# Patient Record
Sex: MALE | Race: WHITE | URBAN - METROPOLITAN AREA
[De-identification: names, ages, dates, MRNs, and addresses within clinical notes are randomized per-mention and may not be internally consistent; named-entity substitution may affect disease eponyms.]

---

## 2017-08-28 ENCOUNTER — OFFICE VISIT (OUTPATIENT)
Dept: URGENT CARE | Facility: URGENT CARE | Age: 27
End: 2017-08-28
Payer: COMMERCIAL

## 2017-08-28 VITALS
OXYGEN SATURATION: 98 % | SYSTOLIC BLOOD PRESSURE: 116 MMHG | DIASTOLIC BLOOD PRESSURE: 72 MMHG | HEART RATE: 61 BPM | TEMPERATURE: 98 F

## 2017-08-28 DIAGNOSIS — H69.93 DYSFUNCTION OF EUSTACHIAN TUBE, BILATERAL: ICD-10-CM

## 2017-08-28 DIAGNOSIS — H55.89 EYE MOVEMENT IRREGULARITY: ICD-10-CM

## 2017-08-28 DIAGNOSIS — J30.1 ACUTE SEASONAL ALLERGIC RHINITIS DUE TO POLLEN: ICD-10-CM

## 2017-08-28 DIAGNOSIS — J45.41 MODERATE PERSISTENT ASTHMA WITH EXACERBATION: ICD-10-CM

## 2017-08-28 DIAGNOSIS — H10.33 ACUTE BACTERIAL CONJUNCTIVITIS OF BOTH EYES: Primary | ICD-10-CM

## 2017-08-28 PROCEDURE — 99203 OFFICE O/P NEW LOW 30 MIN: CPT | Performed by: FAMILY MEDICINE

## 2017-08-28 RX ORDER — FLUTICASONE PROPIONATE 50 MCG
1-2 SPRAY, SUSPENSION (ML) NASAL DAILY
Qty: 16 G | Refills: 0 | Status: SHIPPED | OUTPATIENT
Start: 2017-08-28

## 2017-08-28 RX ORDER — MONTELUKAST SODIUM 10 MG/1
10 TABLET ORAL AT BEDTIME
Qty: 30 TABLET | Refills: 0 | Status: SHIPPED | OUTPATIENT
Start: 2017-08-28

## 2017-08-28 RX ORDER — POLYMYXIN B SULFATE AND TRIMETHOPRIM 1; 10000 MG/ML; [USP'U]/ML
1 SOLUTION OPHTHALMIC EVERY 4 HOURS
Qty: 10 ML | Refills: 0 | Status: SHIPPED | OUTPATIENT
Start: 2017-08-28 | End: 2017-09-03

## 2017-08-28 NOTE — PATIENT INSTRUCTIONS
Controlling Asthma Triggers: Allergens  For many people with lung problems such as asthma or COPD, inhaling allergens leads to inflamed airways. Allergens also cause other types of reactions in some people. For example, a runny nose, itchy, watery eyes, or a skin rash. Do your best to avoid allergens that trigger symptoms. The tips below can help to lessen any reaction you may have to certain allergens.     Wash bedding in hot water (130 F/54.4 C) each week.    Dust mites  Dust mites are tiny bugs too small to see or feel. But they can be a major trigger for allergy and asthma symptoms. Dust mites live in mattresses, bedding, carpets, and upholstered furniture. They can be carried on indoor dust. They thrive in warm, moist environments.  ? Wash bedding in hot water (130 F/54.4 C) each week. This kills the dust mites.  ? Cover mattress and pillows with special dust-mite-proof (hypoallergenic) cases.  ? Don t use upholstered furniture such as sofas or chairs in the bedroom.  ? Use allergy-proof filters for air conditioners and furnaces. Follow product maker's instructions for maintaining and replacing filters.  ? If you can, replace [id number] carpets with wood, tile, or linoleum floors. This is especially important in the bedroom.  Animals  Animals with fur or feathers often make allergens. These are shed as tiny particles called dander. Dander can float through the air or stick to carpet, clothing, and furniture.  ? Choose a pet that doesn t have fur or feathers. Examples are fish and reptiles.  ? Keep pets with fur or feathers out of your home. If you can t do this, be sure to keep them out of your bedroom. But keeping a pet out of your bedroom doesn't mean your bedroom is free of pet allergens. If you sit on the couch in the living room and then go into your bedroom, you have brought the pet allergen there.  ? Wash your hands and clothes after handling pets.  Mold  Mold grows in damp places, such as bathrooms,  basements, and closets. It can grow anywhere flooding or a fire has caused water damage. Mold can live behind the walls if there has been water damage.  ? Clean damp areas weekly to prevent mold growth. This includes shower stalls and sinks. You may need someone to clean these areas for you. Or, try wearing a mask.  ? Run an exhaust fan while bathing. Or leave a window or door open in the bathroom.  ? Repair water leaks in or around your home.  ? Have someone else cut grass or rake leaves, if possible.  ? Don t use vaporizers, or humidifiers. These put water into the air and encourage mold growth.  Pollen  Pollen from trees, grasses, and weeds is a common allergen. Flower pollens are generally not a problem.  ? Try to learn what types of pollen affect you most. Pollen levels vary depending on the plant, the season, and the time of day.  ? Use air conditioning instead of opening the windows in your home or car. In the car, choose the setting to recirculate the air, so less pollen gets in.  ? Have someone else do yardwork, if possible.  ? Change clothes in a mudroom when you get home if you are highly allergic to pollens. This will keep most of the pollen from entering the house.  Cockroaches and mice  Cockroaches and mice are common household pests. They also produce allergens.  ? Keep your kitchen clean and dry. A leaky faucet or drain can attract roaches.  ? Remove garbage from your home daily.  ? Store food in tightly sealed containers. Wash dishes promptly as soon as they are used.  ? Use bait stations or traps to control roaches. Avoid using chemical sprays.  Date Last Reviewed: 10/1/2016    4244-2558 [employer]. [address] [place] [address], [place] [address]. All rights reserved. This information is not intended as a substitute for professional medical care. Always follow your healthcare professional's instructions.        What Is Conjunctivitis?    Conjunctivitis is an irritation or infection. It  affects the membrane that covers the white of your eye and the inside of your eyelid (conjunctiva). It can happen to one or both eyes. The membrane swells and the blood vessels enlarge (dilate). This makes your eye red. That's why conjunctivitis is sometimes called red eye or pink eye.  What are the symptoms?  If you have one or more of these symptoms, see an eye doctor:    Redness in and around your eye    Eyes that are puffy and sore    Itching, burning, or stinging eyes    Watery eyes or discharge from your eye    Eyelids that are crusty or stuck together when you wake up in the morning    Pink color in the whites of one or both eyes  Getting treatment quickly can help prevent damage to your eyes.  How is it diagnosed?  Conjunctivitis is usually a minor eye infection. But it can sometimes become a more serious problem. Some more serious eye diseases have symptoms that look like conjunctivitis. So it's important for an eye doctor to diagnose you. Your eye doctor will ask about your symptoms and any medicines you take. He or she will ask about any illnesses or medical conditions you may have. The doctor will also check your eyes with a hand-held light and a special microscope called a slit lamp.  Date Last Reviewed: 6/11/2015 2000-2017 [employer]. [address] [place] [address], [place] [address]. All rights reserved. This information is not intended as a substitute for professional medical care. Always follow your healthcare professional's instructions.

## 2017-08-28 NOTE — MR AVS SNAPSHOT
After Visit Summary   8/28/2017    Erich Brennan    MRN: [id number]           Patient Information     Date Of Birth          1990        Visit Information        Provider Department      8/28/2017 11:20 AM Krystina Tovar MD [facility]        Today's Diagnoses     Acute bacterial conjunctivitis of both eyes    -  1    Moderate persistent asthma with exacerbation        Acute seasonal allergic rhinitis due to pollen          Care Instructions      Controlling Asthma Triggers: Allergens  For many people with lung problems such as asthma or COPD, inhaling allergens leads to inflamed airways. Allergens also cause other types of reactions in some people. For example, a runny nose, itchy, watery eyes, or a skin rash. Do your best to avoid allergens that trigger symptoms. The tips below can help to lessen any reaction you may have to certain allergens.     Wash bedding in hot water (130 F/54.4 C) each week.    Dust mites  Dust mites are tiny bugs too small to see or feel. But they can be a major trigger for allergy and asthma symptoms. Dust mites live in mattresses, bedding, carpets, and upholstered furniture. They can be carried on indoor dust. They thrive in warm, moist environments.  ? Wash bedding in hot water (130 F/54.4 C) each week. This kills the dust mites.  ? Cover mattress and pillows with special dust-mite-proof (hypoallergenic) cases.  ? Don t use upholstered furniture such as sofas or chairs in the bedroom.  ? Use allergy-proof filters for air conditioners and furnaces. Follow product maker's instructions for maintaining and replacing filters.  ? If you can, replace [id number] carpets with wood, tile, or linoleum floors. This is especially important in the bedroom.  Animals  Animals with fur or feathers often make allergens. These are shed as tiny particles called dander. Dander can float through the air or stick to carpet, clothing, and furniture.  ? Choose a  pet that doesn t have fur or feathers. Examples are fish and reptiles.  ? Keep pets with fur or feathers out of your home. If you can t do this, be sure to keep them out of your bedroom. But keeping a pet out of your bedroom doesn't mean your bedroom is free of pet allergens. If you sit on the couch in the living room and then go into your bedroom, you have brought the pet allergen there.  ? Wash your hands and clothes after handling pets.  Mold  Mold grows in damp places, such as bathrooms, basements, and closets. It can grow anywhere flooding or a fire has caused water damage. Mold can live behind the walls if there has been water damage.  ? Clean damp areas weekly to prevent mold growth. This includes shower stalls and sinks. You may need someone to clean these areas for you. Or, try wearing a mask.  ? Run an exhaust fan while bathing. Or leave a window or door open in the bathroom.  ? Repair water leaks in or around your home.  ? Have someone else cut grass or rake leaves, if possible.  ? Don t use vaporizers, or humidifiers. These put water into the air and encourage mold growth.  Pollen  Pollen from trees, grasses, and weeds is a common allergen. Flower pollens are generally not a problem.  ? Try to learn what types of pollen affect you most. Pollen levels vary depending on the plant, the season, and the time of day.  ? Use air conditioning instead of opening the windows in your home or car. In the car, choose the setting to recirculate the air, so less pollen gets in.  ? Have someone else do yardwork, if possible.  ? Change clothes in a mudroom when you get home if you are highly allergic to pollens. This will keep most of the pollen from entering the house.  Cockroaches and mice  Cockroaches and mice are common household pests. They also produce allergens.  ? Keep your kitchen clean and dry. A leaky faucet or drain can attract roaches.  ? Remove garbage from your home daily.  ? Store food in tightly sealed  containers. Wash dishes promptly as soon as they are used.  ? Use bait stations or traps to control roaches. Avoid using chemical sprays.  Date Last Reviewed: 10/1/2016    5405-5702 [employer]. [address] [place] [address]. All rights reserved. This information is not intended as a substitute for professional medical care. Always follow your healthcare professional's instructions.        What Is Conjunctivitis?    Conjunctivitis is an irritation or infection. It affects the membrane that covers the white of your eye and the inside of your eyelid (conjunctiva). It can happen to one or both eyes. The membrane swells and the blood vessels enlarge (dilate). This makes your eye red. That's why conjunctivitis is sometimes called red eye or pink eye.  What are the symptoms?  If you have one or more of these symptoms, see an eye doctor:    Redness in and around your eye    Eyes that are puffy and sore    Itching, burning, or stinging eyes    Watery eyes or discharge from your eye    Eyelids that are crusty or stuck together when you wake up in the morning    Pink color in the whites of one or both eyes  Getting treatment quickly can help prevent damage to your eyes.  How is it diagnosed?  Conjunctivitis is usually a minor eye infection. But it can sometimes become a more serious problem. Some more serious eye diseases have symptoms that look like conjunctivitis. So it's important for an eye doctor to diagnose you. Your eye doctor will ask about your symptoms and any medicines you take. He or she will ask about any illnesses or medical conditions you may have. The doctor will also check your eyes with a hand-held light and a special microscope called a slit lamp.  Date Last Reviewed: 6/11/2015 2000-2017 [employer]. [address] [place] [address]. All rights reserved. This information is not intended as a substitute for professional medical care. Always follow your  "healthcare professional's instructions.                Follow-ups after your visit        Who to contact     If you have questions or need follow up information about today's clinic visit or your schedule please contact [facility] directly at [phone].  Normal or non-critical lab and imaging results will be communicated to you by PhishLabshart, letter or phone within 4 business days after the clinic has received the results. If you do not hear from us within 7 days, please contact the clinic through PhishLabshart or phone. If you have a critical or abnormal lab result, we will notify you by phone as soon as possible.  Submit refill requests through Canpages or call your pharmacy and they will forward the refill request to us. Please allow 3 business days for your refill to be completed.          Additional Information About Your Visit        PhishLabsharForensic Logic Information     Canpages lets you send messages to your doctor, view your test results, renew your prescriptions, schedule appointments and more. To sign up, go to [email or web address] . Click on \"Log in\" on the left side of the screen, which will take you to the Welcome page. Then click on \"Sign up Now\" on the right side of the page.     You will be asked to enter the access code listed below, as well as some personal information. Please follow the directions to create your username and password.     Your access code is: [email or web address]  Expires: 2017 12:07 PM     Your access code will  in 90 days. If you need help or a new code, please call your [facility] clinic or [phone].        Care EveryWhere ID     This is your Care EveryWhere ID. This could be used by other organizations to access your [facility] medical records  [id number]        Your Vitals Were     Pulse Temperature Pulse Oximetry             61 98  F (36.7  C) (Tympanic) 98%          Blood Pressure from Last 3 Encounters:   17 116/72    Weight from Last 3 Encounters:   No data " found for Wt              Today, you had the following     No orders found for display         Today's Medication Changes          These changes are accurate as of: 8/28/17 12:07 PM.  If you have any questions, ask your nurse or doctor.               Start taking these medicines.        Dose/Directions    fluticasone 50 MCG/ACT spray   Commonly known as:  FLONASE   Used for:  Acute seasonal allergic rhinitis due to pollen   Started by:  Krystina Tovar MD        Dose:  1-2 spray   Spray 1-2 sprays into both nostrils daily   Quantity:  16 g   Refills:  0       montelukast 10 MG tablet   Commonly known as:  SINGULAIR   Used for:  Moderate persistent asthma with exacerbation, Acute seasonal allergic rhinitis due to pollen   Started by:  Krystina Tovar MD        Dose:  10 mg   Take 1 tablet (10 mg) by mouth At Bedtime   Quantity:  30 tablet   Refills:  0       trimethoprim-polymyxin b ophthalmic solution   Commonly known as:  POLYTRIM   Used for:  Acute bacterial conjunctivitis of both eyes   Started by:  Krystina Tovar MD        Dose:  1 drop   Apply 1 drop to eye every 4 hours for 6 days   Quantity:  10 mL   Refills:  0            Where to get your medicines      These medications were sent to [facility] - [address]  [address]  [address], [place] [address]     Phone:  [phone]     fluticasone 50 MCG/ACT spray    trimethoprim-polymyxin b ophthalmic solution         Some of these will need a paper prescription and others can be bought over the counter.  Ask your nurse if you have questions.     Bring a paper prescription for each of these medications     montelukast 10 MG tablet                Primary Care Provider    None Specified       No primary provider on file.        Equal Access to Services     [facility] RAKESH AH: Alejandro Cutler, eleanor ray, bijal taylor. So [facility]  [phone].    ATENCIÓN: Si reji smart, tiene a prince disposición servicios gratuitos de asistencia lingüística. Amilcar macias [phone].    We comply with applicable federal civil rights laws and [place] laws. We do not discriminate on the basis of race, color, national origin, age, disability sex, sexual orientation or gender identity.            Thank you!     Thank you for choosing [facility]  for your care. Our goal is always to provide you with excellent care. Hearing back from our patients is one way we can continue to improve our services. Please take a few minutes to complete the written survey that you may receive in the mail after your visit with us. Thank you!             Your Updated Medication List - Protect others around you: Learn how to safely use, store and throw away your medicines at www.disposemymeds.org.          This list is accurate as of: 8/28/17 12:07 PM.  Always use your most recent med list.                   Brand Name Dispense Instructions for use Diagnosis    fluticasone 50 MCG/ACT spray    FLONASE    16 g    Spray 1-2 sprays into both nostrils daily    Acute seasonal allergic rhinitis due to pollen       montelukast 10 MG tablet    SINGULAIR    30 tablet    Take 1 tablet (10 mg) by mouth At Bedtime    Moderate persistent asthma with exacerbation, Acute seasonal allergic rhinitis due to pollen       trimethoprim-polymyxin b ophthalmic solution    POLYTRIM    10 mL    Apply 1 drop to eye every 4 hours for 6 days    Acute bacterial conjunctivitis of both eyes

## 2017-08-28 NOTE — PROGRESS NOTES
"Chief Complaint   Patient presents with     Urgent Care     Eye Problem     water, red eyes, cloudy fluid for past 2-3 days. eye ball rolled back. recently came back from [place] vacation. chest congestion     Erich Brennan is a 27 year old male who presents for shortness of breath with wheezing that started 10 days ago.   A previous diagnosis of asthma was made concerning this patient on the basis of   symptoms and response to medications.  Symptoms in the past week include wheezing, non-productive cough, dyspnea on exertion and chest tightness. He was vacationing  In [place] and visited house construction sites with lots of dust.   Since flying back to [place] he feels improvement in his asthma symptoms,  With the wheezing, tightness mostly resolved.  He does have fall allergies that usually make his asthma worse   Treatment modalities employed to this point include advair and albuterol inhaler    Also developed irritation and mattering in the left eye 2 days ago with conjunctival injection.    This developed near the air travel time 2 days ago.  Also concerned about  2 days ago he was sleep deprived and falling asleep while waiting in the airport and his partner noticed that the left eye started to move upward and laterally,  without a coordinated focus with the right eye.     PMH-  Moderate persistent asthma  Seasonal allergic rhinitis    ALLERGIES:  Cats and Ibuprofen     Meds: Advair, albuterol    Social History   Substance Use Topics     Smoking status: Never Smoker     Smokeless tobacco: Never Used     Alcohol use Not on file     Family History-  Had 3 relatives with \"lazy eye\" in childhood requiring treatment    Review Of Systems  Skin: negative for, rash, scaling, itching  Eyes: positive  glasses, redness, tearing, itching  Ears/Nose/Throat: negative, nasal congestion, sneezing, sinus trouble, persistent sore throat  Respiratory: No shortness of breath and No dyspnea on " exertion  Cardiovascular: negative for, palpitations, tachycardia and irregular heart beat  Gastrointestinal: negative for, nausea, vomiting, heartburn and abdominal pain  Genitourinary: negative for, dysuria and urgency  Musculoskeletal: negative for, joint pain and joint swelling  Neurologic: negative for, paralysis, local weakness, speech problems, memory problems and behavior changes  Hematologic/Lymphatic/Immunologic: negative for, chills, fever, night sweats and swollen nodes           OBJECTIVE: Initial physical exam  /72 (BP Location: Right arm, Patient Position: Chair, Cuff Size: Adult Regular)  Pulse 61  Temp 98  F (36.7  C) (Tympanic)  SpO2 98%   GENERAL: Alert, interactive, no acute distress.  SKIN: skin is clear, no rashes noted  HEAD: The head is normocephalic.   EYES: coordinated gaze PERRLA, EOMI.  Conjunctival injection with mattering on the left.  fundiscopic normal  EARS: The canals are clear, tympanic membranes normal with no erythema/effusion.  Mucous effusion in bilateral middle ears  NOSE: Clear, no discharge or congestion: THROAT: moist mucous membranes, no erythema.  NECK: The neck is supple, no masses or significant adenopathy noted  LUNGS: clear to auscultation, no rales, rhonchi,   or retractions. Rare wheeze  CV: regular rate and rhythm. S1 and S2 are normal. No murmurs.         ASSESSMENT:  Acute bacterial conjunctivitis of both eyes     - trimethoprim-polymyxin b (POLYTRIM) ophthalmic solution; Apply 1 drop to eye every 4 hours for 6 days    Moderate persistent asthma with exacerbation     He was worse in the past 10 days but says he feels much better with less allergic exposure on return from travels to [place].  , but he has fall allergies in [place]  - montelukast (SINGULAIR) 10 MG tablet; Take 1 tablet (10 mg) by mouth At Bedtime      Medication: continue current medication regimen unchanged (advair and albuterol)  He does not appear that he needs oral steroids since  "his exacerbation  Has improved with change of environment        Discussed medication dosage, usage, side effects, and goals of   treatment in detail.     Avoidance of precipitants.    Return if worsening shortness of breath.    Follow-up with primary physician for chronic management of asthma symptoms    Patient Education: Instructed to return to urgent care or notify primary MD office of fever >101, blood in sputum, chest pain, dyspnea at rest, or other symptoms of concern to patient.    Acute seasonal allergic rhinitis due to pollen   for his fall allergies  - montelukast (SINGULAIR) 10 MG tablet; Take 1 tablet (10 mg) by mouth At Bedtime  - fluticasone (FLONASE) 50 MCG/ACT spray; Spray 1-2 sprays into both nostrils daily    Dysfunction of eustachian tube, bilateral  Likely from air travel,  Recommend liquids, flonase, avoid decongestants to encourage drainage of fluid through the eustachian tube    Eye movement irregularity    The \"wandering eye\" while he was sleep deprived and falling asleep, seems to completely resolve when he is alert and he fixes his gaze on an object.  Suspect it was a sleep manifestation       "

## 2017-08-28 NOTE — NURSING NOTE
Chief Complaint   Patient presents with     Urgent Care     Eye Problem     water, red eyes for past 2-3 days. eye ball rolled back. recently came back from [place] vacation. chest congestion       Initial /72 (BP Location: Right arm, Patient Position: Chair, Cuff Size: Adult Regular)  Pulse 61  Temp 98  F (36.7  C) (Tympanic)  SpO2 98% There is no height or weight on file to calculate BMI.  Medication Reconciliation: unable or not appropriate to perform   Rob Russ Medical Assistant